# Patient Record
Sex: MALE | Race: WHITE | NOT HISPANIC OR LATINO
[De-identification: names, ages, dates, MRNs, and addresses within clinical notes are randomized per-mention and may not be internally consistent; named-entity substitution may affect disease eponyms.]

---

## 2024-04-29 PROBLEM — Z00.00 ENCOUNTER FOR PREVENTIVE HEALTH EXAMINATION: Status: ACTIVE | Noted: 2024-04-29

## 2024-05-03 ENCOUNTER — APPOINTMENT (OUTPATIENT)
Dept: ORTHOPEDIC SURGERY | Facility: CLINIC | Age: 54
End: 2024-05-03
Payer: COMMERCIAL

## 2024-05-03 VITALS — BODY MASS INDEX: 32.58 KG/M2 | HEIGHT: 69 IN | WEIGHT: 220 LBS

## 2024-05-03 DIAGNOSIS — E78.00 PURE HYPERCHOLESTEROLEMIA, UNSPECIFIED: ICD-10-CM

## 2024-05-03 DIAGNOSIS — M23.92 UNSPECIFIED INTERNAL DERANGEMENT OF LEFT KNEE: ICD-10-CM

## 2024-05-03 DIAGNOSIS — I10 ESSENTIAL (PRIMARY) HYPERTENSION: ICD-10-CM

## 2024-05-03 DIAGNOSIS — I42.9 CARDIOMYOPATHY, UNSPECIFIED: ICD-10-CM

## 2024-05-03 PROCEDURE — 99214 OFFICE O/P EST MOD 30 MIN: CPT

## 2024-05-03 PROCEDURE — 73564 X-RAY EXAM KNEE 4 OR MORE: CPT | Mod: LT

## 2024-05-03 RX ORDER — TICAGRELOR 60 MG/1
TABLET ORAL
Refills: 0 | Status: ACTIVE | COMMUNITY

## 2024-05-03 RX ORDER — ASPIRIN 325 MG/1
TABLET, FILM COATED ORAL
Refills: 0 | Status: ACTIVE | COMMUNITY

## 2024-05-03 RX ORDER — METOPROLOL TARTRATE 75 MG/1
TABLET, FILM COATED ORAL
Refills: 0 | Status: ACTIVE | COMMUNITY

## 2024-05-03 RX ORDER — TRAMADOL HYDROCHLORIDE 50 MG/1
50 TABLET, COATED ORAL
Qty: 40 | Refills: 0 | Status: ACTIVE | COMMUNITY
Start: 2024-05-03 | End: 1900-01-01

## 2024-05-03 RX ORDER — ATORVASTATIN CALCIUM 80 MG/1
TABLET, FILM COATED ORAL
Refills: 0 | Status: ACTIVE | COMMUNITY

## 2024-05-03 NOTE — HISTORY OF PRESENT ILLNESS
[de-identified] : 5/3/24: Patient is here for left knee pain that began 3 years ago, not due to injury. Notes recent increased pain. Pain is medial. No clicking/buckling. Experiences locking. Worsens with prolonged walking. No prior injury. No formal treatment to date. Tried Tylenol/Motrin. Mentioned bilateral hand pain. Received CSI in 2021 with [place]. Inquiring about repeat.

## 2024-05-03 NOTE — ASSESSMENT
[FreeTextEntry1] : Exacerbation of chronic left knee pain.  He has had corticosteroid injections in the past with relief.  Symptoms are usually brief but this time has been persistent over the last couple of weeks.  There are intermittent mechanical symptoms.  His x-rays are unremarkable.  Prognosis is uncertain at this time . We reviewed a comprehensive treatment plan.  Given the chronicity of the symptoms with essentially unremarkable x-rays I advised an MRI to rule out medial meniscal tear.  He is very active with golfing.  He cannot take NSAIDs due to blood thinners for a cardiac procedure.  Prescription tramadol sent to the pharmacy.  Side effects reviewed.  Follow-up to review MRI.  Briefly discussed arthroscopic surgery if he has a torn meniscus.  Otherwise could consider corticosteroid injection prior to his trip next weekend.   The patient's current medication management of their orthopedic diagnosis was reviewed today: (1) We discussed a comprehensive treatment plan that included pharmaceutical management involving the use of prescription medications. (2) There is a moderate risk of morbidity with further treatment, especially from use of prescription strength medications and possible side effects of these medications which include upset stomach with oral medications, skin reactions to topical medications and cardiac/renal/diabetes issues with long term use. (3) I recommended that the patient follow-up with their medical physician to discuss any significant specific potential issues with long term medication use such as interactions with current medications or with exacerbation of underlying medical comorbidities. (4) The benefits and risks associated with use of injectable, oral or topical, prescription and over the counter anti-inflammatory medications were discussed with the patient. The patient voiced understanding of the risks including but not limited to bleeding, stroke, kidney dysfunction, heart disease, and were referred to the black box warning label for further information.

## 2024-05-03 NOTE — IMAGING
[de-identified] : Left knee: Mild effusion, no warmth, no ecchymosis Medial joint line tenderness to palpation Range of motion 0-130 5/5 quadriceps and hamstring strength Positive Erich No varus or valgus instability, negative lachman Motor and sensory intact distally Mildly antalgic gait [Left] : left knee [All Views] : anteroposterior, lateral, skyline, and anteroposterior standing [There are no fractures, subluxations or dislocations. No significant abnormalities are seen] : There are no fractures, subluxations or dislocations. No significant abnormalities are seen

## 2024-05-09 ENCOUNTER — APPOINTMENT (OUTPATIENT)
Dept: MRI IMAGING | Facility: CLINIC | Age: 54
End: 2024-05-09
Payer: COMMERCIAL

## 2024-05-09 PROCEDURE — 73721 MRI JNT OF LWR EXTRE W/O DYE: CPT | Mod: LT

## 2024-05-16 ENCOUNTER — APPOINTMENT (OUTPATIENT)
Dept: ORTHOPEDIC SURGERY | Facility: CLINIC | Age: 54
End: 2024-05-16
Payer: COMMERCIAL

## 2024-05-16 VITALS — BODY MASS INDEX: 32.58 KG/M2 | WEIGHT: 220 LBS | HEIGHT: 69 IN

## 2024-05-16 DIAGNOSIS — S83.232D COMPLEX TEAR OF MEDIAL MENISCUS, CURRENT INJURY, LEFT KNEE, SUBSEQUENT ENCOUNTER: ICD-10-CM

## 2024-05-16 PROCEDURE — 20611 DRAIN/INJ JOINT/BURSA W/US: CPT | Mod: LT

## 2024-05-16 PROCEDURE — J3490M: CUSTOM

## 2024-05-16 PROCEDURE — 99214 OFFICE O/P EST MOD 30 MIN: CPT | Mod: 25

## 2024-05-16 RX ORDER — MELOXICAM 15 MG/1
15 TABLET ORAL DAILY
Qty: 30 | Refills: 1 | Status: ACTIVE | COMMUNITY
Start: 2024-05-16 | End: 2024-07-15

## 2024-05-16 NOTE — IMAGING
[de-identified] : Knee Exam: Inspection: Mild effusion, no warmth, no ecchymosis Palpation: Medial joint line tenderness to palpation Range of motion 0-130 Strength: 5/5 quadriceps and hamstring strength Positive [facility] Stability: No varus or valgus instability, negative lachman Motor and sensory intact distally Gait: Mildly antalgic gait

## 2024-05-16 NOTE — ASSESSMENT
[FreeTextEntry1] : Reviewed and interpreted MRI images in detail today.  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  He thinks he wants to wait until the winter to have arthroscopy for this.  Risk of continued and worsening injury reviewed.  Precautions and restrictions reviewed.  Corticosteroid injection will be administered today for immediate pain relief.  Postoperative course outlined risk benefits and alternatives reviewed.  He wants to proceed.  The risks and benefits of surgery have been discussed. Risks include but are not limited to bleeding, infection, reaction to anesthesia, injury to blood vessels and nerves, malunion, nonunion, DVT, PE, necessity of repeat surgery, chronic pain, loss of limb and death. The patient understands the risks and agrees with the surgical plan. All questions have been answered.

## 2024-05-16 NOTE — HISTORY OF PRESENT ILLNESS
[de-identified] : 5/3/24: Patient is here for left knee pain that began 3 years ago, not due to injury. Notes recent increased pain. Pain is medial. No clicking/buckling. Experiences locking. Worsens with prolonged walking. No prior injury. No formal treatment to date. Tried Tylenol/Motrin. Mentioned bilateral hand pain. Received CSI in 2021 with [place]. Inquiring about repeat.   5/16/24: Here for left knee MRI results.

## 2024-08-09 ENCOUNTER — APPOINTMENT (OUTPATIENT)
Dept: ORTHOPEDIC SURGERY | Facility: CLINIC | Age: 54
End: 2024-08-09

## 2024-08-09 PROCEDURE — 99213 OFFICE O/P EST LOW 20 MIN: CPT | Mod: 25

## 2024-08-09 PROCEDURE — 99203 OFFICE O/P NEW LOW 30 MIN: CPT | Mod: 25

## 2024-08-09 PROCEDURE — 20550 NJX 1 TENDON SHEATH/LIGAMENT: CPT | Mod: RT

## 2024-08-13 NOTE — HISTORY OF PRESENT ILLNESS
[8] : 8 [0] : 0 [de-identified] : 54 year old male presents middle and little locking of the RT hand. He has had interment pain over the past year. The last couple of months the pain has been signifgantly worse .  [] : no [FreeTextEntry5] : 08/09/24: Pt is a 53 y/o male presenting of right-hand pain. Pt states pain started 08/2023. Pain flared up 1-2 months.  Experience locking and clicking. Had CSI with left hand which helped. No numbness/tingling. Tylenol for pain.

## 2024-08-13 NOTE — DISCUSSION/SUMMARY
[de-identified] : Discussed the nature of the diagnosis and risk and benefits of different modalities of treatment. Middle and little finger of the RT hand Discussed conservative management as symptoms demand vs CSI. Pt would like a CSI in the middle and little finger  Done today and tolerated well. All questions answered.

## 2024-08-13 NOTE — HISTORY OF PRESENT ILLNESS
[8] : 8 [0] : 0 [de-identified] : 54 year old male presents middle and little locking of the RT hand. He has had interment pain over the past year. The last couple of months the pain has been signifgantly worse .  [] : no [FreeTextEntry5] : 08/09/24: Pt is a 53 y/o male presenting of right-hand pain. Pt states pain started 08/2023. Pain flared up 1-2 months.  Experience locking and clicking. Had CSI with left hand which helped. No numbness/tingling. Tylenol for pain.

## 2024-08-13 NOTE — PROCEDURE
[FreeTextEntry3] : Tendon sheath was performed of the right 3rd trigger finger. The indication for this procedure was pain and inflammation. The site was prepped with alcohol, ethyl chloride sprayed topically, and sterile technique used. An injection of Lidocaine 0.5cc of 1%, Triamcinolone (Kenalog) 0.5cc of 10 mg was used. Patient tolerated procedure well.   The risks, benefits, and alternatives have been discussed, and verbal consent was obtained.     Tendon sheath was performed of the right 5th trigger finger. The indication for this procedure was pain and inflammation. The site was prepped with alcohol, ethyl chloride sprayed topically, and sterile technique used. An injection of Lidocaine 0.5cc of 1%, Triamcinolone (Kenalog) 0.5cc of 10 mg was used. Patient tolerated procedure well.   The risks benefits, and alternatives have been discussed, and verbal consent was obtained.

## 2024-08-13 NOTE — DISCUSSION/SUMMARY
[de-identified] : Discussed the nature of the diagnosis and risk and benefits of different modalities of treatment. Middle and little finger of the RT hand Discussed conservative management as symptoms demand vs CSI. Pt would like a CSI in the middle and little finger  Done today and tolerated well. All questions answered.

## 2025-02-21 ENCOUNTER — APPOINTMENT (OUTPATIENT)
Dept: ORTHOPEDIC SURGERY | Facility: CLINIC | Age: 55
End: 2025-02-21
Payer: COMMERCIAL

## 2025-02-21 VITALS — WEIGHT: 220 LBS | HEIGHT: 69 IN | BODY MASS INDEX: 32.58 KG/M2

## 2025-02-21 DIAGNOSIS — M65.351 TRIGGER FINGER, RIGHT LITTLE FINGER: ICD-10-CM

## 2025-02-21 DIAGNOSIS — M65.331 TRIGGER FINGER, RIGHT MIDDLE FINGER: ICD-10-CM

## 2025-02-21 PROCEDURE — 20550 NJX 1 TENDON SHEATH/LIGAMENT: CPT | Mod: RT

## 2025-08-04 ENCOUNTER — NON-APPOINTMENT (OUTPATIENT)
Age: 55
End: 2025-08-04

## 2025-08-06 ENCOUNTER — APPOINTMENT (OUTPATIENT)
Dept: PULMONOLOGY | Facility: CLINIC | Age: 55
End: 2025-08-06
Payer: COMMERCIAL

## 2025-08-06 VITALS
RESPIRATION RATE: 16 BRPM | HEIGHT: 69.5 IN | DIASTOLIC BLOOD PRESSURE: 74 MMHG | BODY MASS INDEX: 32.72 KG/M2 | WEIGHT: 226 LBS | SYSTOLIC BLOOD PRESSURE: 118 MMHG | OXYGEN SATURATION: 96 % | HEART RATE: 66 BPM

## 2025-08-06 DIAGNOSIS — Z78.9 OTHER SPECIFIED HEALTH STATUS: ICD-10-CM

## 2025-08-06 DIAGNOSIS — Z82.49 FAMILY HISTORY OF ISCHEMIC HEART DISEASE AND OTHER DISEASES OF THE CIRCULATORY SYSTEM: ICD-10-CM

## 2025-08-06 DIAGNOSIS — Z82.5 FAMILY HISTORY OF ASTHMA AND OTHER CHRONIC LOWER RESPIRATORY DISEASES: ICD-10-CM

## 2025-08-06 DIAGNOSIS — R06.02 SHORTNESS OF BREATH: ICD-10-CM

## 2025-08-06 DIAGNOSIS — Z86.16 PERSONAL HISTORY OF COVID-19: ICD-10-CM

## 2025-08-06 DIAGNOSIS — Z87.891 PERSONAL HISTORY OF NICOTINE DEPENDENCE: ICD-10-CM

## 2025-08-06 DIAGNOSIS — G47.33 OBSTRUCTIVE SLEEP APNEA (ADULT) (PEDIATRIC): ICD-10-CM

## 2025-08-06 DIAGNOSIS — E66.9 OBESITY, UNSPECIFIED: ICD-10-CM

## 2025-08-06 DIAGNOSIS — I25.10 ATHEROSCLEROTIC HEART DISEASE OF NATIVE CORONARY ARTERY W/OUT ANGINA PECTORIS: ICD-10-CM

## 2025-08-06 PROCEDURE — 99204 OFFICE O/P NEW MOD 45 MIN: CPT

## 2025-08-06 RX ORDER — CLOPIDOGREL BISULFATE 75 MG/1
75 TABLET, FILM COATED ORAL
Refills: 0 | Status: ACTIVE | COMMUNITY

## 2025-08-06 RX ORDER — OLMESARTAN MEDOXOMIL 40 MG/1
40 TABLET, FILM COATED ORAL
Refills: 0 | Status: ACTIVE | COMMUNITY

## 2025-08-22 ENCOUNTER — OUTPATIENT (OUTPATIENT)
Dept: OUTPATIENT SERVICES | Facility: HOSPITAL | Age: 55
LOS: 1 days | End: 2025-08-22

## 2025-08-22 DIAGNOSIS — G47.33 OBSTRUCTIVE SLEEP APNEA (ADULT) (PEDIATRIC): ICD-10-CM

## 2025-08-22 PROCEDURE — 95810 POLYSOM 6/> YRS 4/> PARAM: CPT | Mod: 26
